# Patient Record
(demographics unavailable — no encounter records)

---

## 2025-01-21 NOTE — ASSESSMENT
[FreeTextEntry1] : 34yo M hypogonadism bilateral varicoclee TT therapeutic on clomid past failed IUI. Sperm frozen for IVF as backup We had an in depth conversation regarding the benefit of varicocelectomy today. He understands the literature which overwhelming reports a benefit in improvement in semen parameters. He also understands that there is limited data in terms of spontaneous pregnancy and take home baby rates.There is good data suggesting improved semen parameters which can possibly obviate the need for future IVF and has been shown to improve IVF outcomes in selected patients. We discussed the recent results of a [place] review which reported that one natural pregnancy is achieved for every 3-5 varicocele repairs over an unknown time frame. We also spoke about the fact that there is a 3 to 6 month delay before improvement is seen in semen parameters. The role of advancing maternal age was discussed in depth.  Surgical risks, including a risk of infection, injury to the testicular artery (extremely rare), injury to the vas deferens and hydrocele were discussed, as was post operative pain and pain control. Post operative skin numbness in the anterior thigh/lateral scrotum were also discussed. opts to tatiana  will schedule

## 2025-01-21 NOTE — HISTORY OF PRESENT ILLNESS
[FreeTextEntry1] : KALA PINZON is a 33 year M presenting on 01/21/2025 PMH: Hypogonadism, left varicocele  5 mo F/U  Hypogonadism: oligoasthenopsermia Remains on clomid 50 mg QOD Trying for 4 years IUI 4/2024, miscarriage after 2 months 3.3mm L varicocele, 2.9mm R  Has sperm frozen 8/2024:   SA, 11/2024 vol 2.8 mL, 42.1 M/mL, 36% motil, morph 3%  SA for 2nd IUI 09/26/2023 1.2 mL/16.4 mill/ml/21% motility  SA: 1.5ml/21 mil/ml/10% motility/6%  repeat SA with First IUI: 2.4 ml/12.3 mil/ml/23% motlilty

## 2025-01-21 NOTE — PHYSICAL EXAM
[Normal Appearance] : normal appearance [Well Groomed] : well groomed [General Appearance - In No Acute Distress] : no acute distress [Respiration, Rhythm And Depth] : normal respiratory rhythm and effort [Exaggerated Use Of Accessory Muscles For Inspiration] : no accessory muscle use [Normal Station and Gait] : the gait and station were normal for the patient's age [No Focal Deficits] : no focal deficits [] : no rash [Oriented To Time, Place, And Person] : oriented to person, place, and time [Affect] : the affect was normal [Mood] : the mood was normal

## 2025-03-11 NOTE — PHYSICAL EXAM
[General Appearance - Well Developed] : well developed [General Appearance - Well Nourished] : well nourished [Chaperone Present] : A chaperone was present in the examining room during all aspects of the physical examination [Urethral Meatus] : meatus normal [Penis Abnormality] : normal circumcised penis [de-identified] : left GII [FreeTextEntry2] : [place]

## 2025-03-11 NOTE — HISTORY OF PRESENT ILLNESS
[FreeTextEntry1] : Dear Dr. Hernandez (Urologist),   Thank you so much for the referral to help care for your patient.  Chief Complaint: 3.3mm left Varicocele and 2.9mm right varicocele  Date of first visit: 03/11/2025  KALA PINZON  is a 33-year-old male who presents for varicoceles.  the right side is < 3mm.  He was recommended surgery or embolization.  He was nervous about surgery and Dr. EPPS sent him for a referral.  The patient has one child but for the last 4 years they have been unsuccessful.   He is being treated for hypogonadism/ oligoasthenopsermia.  Remains on clomid 50 mg QOD but the oligospermia has resolved.   IUI 4/2024, miscarriage after 2 months 3.3mm L varicocele, 2.9mm R Has sperm frozen 8/2024:   SA, 11/2024 vol 2.8 mL, 42.1 M/mL, 36% motil, morph 3%  SA for 2nd IUI 09/26/2023 1.2 mL/16.4 mill/ml/21% motility  SA 07/28/2023  Volume: 1.5  pH: 8.0 Concentration: 21,000,000/mL  Total: 32,000,000 Motility was 10% giving a total motile sperm count of 2 million  Forward progression was 2 Agglutination: None Normal morphology was 6%  03/11/2025 IPSS  QOL  ELLY  EPIC 26   The patient denies fevers, chills, nausea and or vomiting and no unexplained weight loss.  All pertinent laboratory, films and physician notes were reviewed.  Questionnaire results were discussed with patient.

## 2025-03-11 NOTE — ASSESSMENT
[FreeTextEntry1] : 33-year-old male with Hypogonadism, left varicocele GII (palp) and the right side does not meet criteria at this time to treat.  We explained the goals of treatment as outlined below.  The issue is that on clomid the SA has improved to normal limits.  He does not have pain associated with the varicocele.  He would like to get off the meds and improve his feritliy chances   Sedation / malampatti I - takes no meds no issues with sedation previously  We discussed disease process and treatment options for symptomatic varicoceles and infertility. The patient understands the scrotal pain may resolve the heaviness that he feels in his scrotum however improving his fertility is not 100% guaranteed. There are studies that show there is improvement in semen parameters after treatment of the varicocele.  We discussed the risks and benefits alternatives associated with gonadal embolization regards to nontarget embolization complications of access and migration of coils. We also discussed the success rates of procedures in comparison to surgery.  We had an in-depth conversation regarding the benefit of varicocelectomy today. He understands the literature which overwhelming reports a benefit in improvement in semen parameters. He also understands that there is limited data in terms of spontaneous pregnancy and take-home baby rates. There is good data suggesting improved semen parameters which can possibly obviate the need for future IVF and has been shown to improve IVF outcomes in selected patients. We discussed the recent results of a [place] review which reported that one natural pregnancy is achieved for every 3-5 varicocele repairs over an unknown time frame. We also spoke about the fact that there is a 3-to-6-month delay before improvement is seen in semen parameters. The role of advancing maternal age was discussed in depth.  Surgical risks, including a risk of infection, injury to the testicular artery (extremely rare), injury to the vas deferens and hydrocele were discussed, as was post operative pain and pain control. Post operative skin numbness in the anterior thigh/lateral scrotum were also discussed.  1. Motrin 800 mg po BID start of the procedure 2. Labs C,7 3. Follow up 1 month after procedure 4. 4-6 months US and Semen analysis (69 days for new sperm to mature) -[facility] Dr Vuong.  Thank you very much for allowing me to assist in the care of this patient. Please do not hesitate to contact me with any additional questions or concerns.  Sincerely,   Bladimir Stewart D.O. Professor of Urology and Radiology  of Urology at [facility] Director for Prostate Cancer [address] [place] [address], 5th Floor [facility], [facility] Phone: [phone].

## 2025-03-11 NOTE — ASSESSMENT
[FreeTextEntry1] : 33-year-old male with Hypogonadism, left varicocele GII (palp) and the right side does not meet criteria at this time to treat.  We explained the goals of treatment as outlined below.  The issue is that on clomid the SA has improved to normal limits.  He does not have pain associated with the varicocele.  He would like to get off the meds and improve his feritliy chances   Sedation / malampatti I - takes no meds no issues with sedation previously  We discussed disease process and treatment options for symptomatic varicoceles and infertility. The patient understands the scrotal pain may resolve the heaviness that he feels in his scrotum however improving his fertility is not 100% guaranteed. There are studies that show there is improvement in semen parameters after treatment of the varicocele.  We discussed the risks and benefits alternatives associated with gonadal embolization regards to nontarget embolization complications of access and migration of coils. We also discussed the success rates of procedures in comparison to surgery.  We had an in-depth conversation regarding the benefit of varicocelectomy today. He understands the literature which overwhelming reports a benefit in improvement in semen parameters. He also understands that there is limited data in terms of spontaneous pregnancy and take-home baby rates. There is good data suggesting improved semen parameters which can possibly obviate the need for future IVF and has been shown to improve IVF outcomes in selected patients. We discussed the recent results of a [place] review which reported that one natural pregnancy is achieved for every 3-5 varicocele repairs over an unknown time frame. We also spoke about the fact that there is a 3-to-6-month delay before improvement is seen in semen parameters. The role of advancing maternal age was discussed in depth.  Surgical risks, including a risk of infection, injury to the testicular artery (extremely rare), injury to the vas deferens and hydrocele were discussed, as was post operative pain and pain control. Post operative skin numbness in the anterior thigh/lateral scrotum were also discussed.  1. Motrin 800 mg po BID start of the procedure 2. Labs C,7 3. Follow up 1 month after procedure 4. 4-6 months US and Semen analysis (69 days for new sperm to mature) -[facility] Dr Vuong.  Thank you very much for allowing me to assist in the care of this patient. Please do not hesitate to contact me with any additional questions or concerns.  Sincerely,   Bladimir Stewart D.O. Professor of Urology and Radiology  of Urology at [facility] Director for Prostate Cancer [address] [place] [address], 5th Floor [facility], [facility] Phone: [phone].

## 2025-03-11 NOTE — PHYSICAL EXAM
[General Appearance - Well Developed] : well developed [General Appearance - Well Nourished] : well nourished [Chaperone Present] : A chaperone was present in the examining room during all aspects of the physical examination [Urethral Meatus] : meatus normal [Penis Abnormality] : normal circumcised penis [de-identified] : left GII [FreeTextEntry2] : [place]

## 2025-03-11 NOTE — PHYSICAL EXAM
[General Appearance - Well Developed] : well developed [General Appearance - Well Nourished] : well nourished [Chaperone Present] : A chaperone was present in the examining room during all aspects of the physical examination [Urethral Meatus] : meatus normal [Penis Abnormality] : normal circumcised penis [de-identified] : left GII [FreeTextEntry2] : [place]

## 2025-03-11 NOTE — ASSESSMENT
[FreeTextEntry1] : 33-year-old male with Hypogonadism, left varicocele GII (palp) and the right side does not meet criteria at this time to treat.  We explained the goals of treatment as outlined below.  The issue is that on clomid the SA has improved to normal limits.  He does not have pain associated with the varicocele.  He would like to get off the meds and improve his feritliy chances   Sedation / malampatti I - takes no meds no issues with sedation previously  We discussed disease process and treatment options for symptomatic varicoceles and infertility. The patient understands the scrotal pain may resolve the heaviness that he feels in his scrotum however improving his fertility is not 100% guaranteed. There are studies that show there is improvement in semen parameters after treatment of the varicocele.  We discussed the risks and benefits alternatives associated with gonadal embolization regards to nontarget embolization complications of access and migration of coils. We also discussed the success rates of procedures in comparison to surgery.  We had an in-depth conversation regarding the benefit of varicocelectomy today. He understands the literature which overwhelming reports a benefit in improvement in semen parameters. He also understands that there is limited data in terms of spontaneous pregnancy and take-home baby rates. There is good data suggesting improved semen parameters which can possibly obviate the need for future IVF and has been shown to improve IVF outcomes in selected patients. We discussed the recent results of a [place] review which reported that one natural pregnancy is achieved for every 3-5 varicocele repairs over an unknown time frame. We also spoke about the fact that there is a 3-to-6-month delay before improvement is seen in semen parameters. The role of advancing maternal age was discussed in depth.  Surgical risks, including a risk of infection, injury to the testicular artery (extremely rare), injury to the vas deferens and hydrocele were discussed, as was post operative pain and pain control. Post operative skin numbness in the anterior thigh/lateral scrotum were also discussed.  1. Motrin 800 mg po BID start of the procedure 2. Labs C,7 3. Follow up 1 month after procedure 4. 4-6 months US and Semen analysis (69 days for new sperm to mature) -[facility] Dr Vuong.  Thank you very much for allowing me to assist in the care of this patient. Please do not hesitate to contact me with any additional questions or concerns.  Sincerely,   Bladimir Stewatr D.O. Professor of Urology and Radiology  of Urology at [facility] Director for Prostate Cancer [address] [place] [address], 5[address] Floor [facility], [facility] Phone: [phone].

## 2025-05-05 NOTE — ASSESSMENT
[FreeTextEntry1] : 33-year-old male with Hypogonadism, left varicocele GII (palp) and the right side does not meet criteria at this time to treat. We explained the goals of treatment as outlined below. The issue is that on clomid the SA has improved to normal limits. He does not have pain associated with the varicocele. He would like to get off the meds and improve his feritliy chances  Sedation / malampatti I - takes no meds no issues with sedation previously  We discussed disease process and treatment options for symptomatic varicoceles and infertility. The patient understands the scrotal pain may resolve the heaviness that he feels in his scrotum however improving his fertility is not 100% guaranteed. There are studies that show there is improvement in semen parameters after treatment of the varicocele.  We discussed the risks and benefits alternatives associated with gonadal embolization regards to nontarget embolization complications of access and migration of coils. We also discussed the success rates of procedures in comparison to surgery.  We had an in-depth conversation regarding the benefit of varicocelectomy today. He understands the literature which overwhelming reports a benefit in improvement in semen parameters. He also understands that there is limited data in terms of spontaneous pregnancy and take-home baby rates. There is good data suggesting improved semen parameters which can possibly obviate the need for future IVF and has been shown to improve IVF outcomes in selected patients. We discussed the recent results of a [place] review which reported that one natural pregnancy is achieved for every 3-5 varicocele repairs over an unknown time frame. We also spoke about the fact that there is a 3-to-6-month delay before improvement is seen in semen parameters. The role of advancing maternal age was discussed in depth.  Surgical risks, including a risk of infection, injury to the testicular artery (extremely rare), injury to the vas deferens and hydrocele were discussed, as was post operative pain and pain control. Post operative skin numbness in the anterior thigh/lateral scrotum were also discussed.  1. Motrin 800 mg po BID start of the procedure 2. Labs C,7 3. Follow up 1 month after procedure 4. 4-6 months US and Semen analysis (69 days for new sperm to mature) -[facility] Dr Vuong.  Thank you very much for allowing me to assist in the care of this patient. Please do not hesitate to contact me with any additional questions or concerns.  Sincerely,

## 2025-05-05 NOTE — HISTORY OF PRESENT ILLNESS
[FreeTextEntry1] : Dear Dr. Hernandez (Urologist),  Thank you so much for the referral to help care for your patient.  Chief Complaint: 3.3mm left Varicocele and 2.9mm right varicocele Date of first visit: 03/11/2025  KALA PINZON is a 33-year-old male who presents for varicoceles. the right side is < 3mm. He was recommended surgery or embolization. He was nervous about surgery and Dr. EPPS sent him for a referral. The patient has one child but for the last 4 years they have been unsuccessful. He is being treated for hypogonadism/ oligoasthenopsermia. Remains on clomid 50 mg QOD but the oligospermia has resolved.   IUI 4/2024, miscarriage after 2 months 3.3mm L varicocele, 2.9mm R Has sperm frozen 8/2024:   SA, 11/2024 vol 2.8 mL, 42.1 M/mL, 36% motil, morph 3%  SA for 2nd IUI 09/26/2023 1.2 mL/16.4 mill/ml/21% motility  SA 07/28/2023 Volume: 1.5 pH: 8.0 Concentration: 21,000,000/mL Total: 32,000,000 Motility was 10% giving a total motile sperm count of 2 million Forward progression was 2 Agglutination: None Normal morphology was 6%  05/13/2025 IPSS  QOL  ELLY   03/11/2025 IPSS QOL ELLY EPIC 26  The patient denies fevers, chills, nausea and or vomiting and no unexplained weight loss.  All pertinent laboratory, films and physician notes were reviewed. Questionnaire results were discussed with patient.

## 2025-05-06 NOTE — ASSESSMENT
[FreeTextEntry1] : 33-year-old male with Hypogonadism, left varicocele GII (palp) and the right side does not meet criteria at this time to treat. We explained the goals of treatment as outlined below. The issue is that on clomid the SA has improved to normal limits. He does not have pain associated with the varicocele. He would like to get off the meds and improve his feritliy chances  Sedation / malampatti I - takes no meds no issues with sedation previously  We discussed disease process and treatment options for symptomatic varicoceles and infertility. The patient understands the scrotal pain may resolve the heaviness that he feels in his scrotum however improving his fertility is not 100% guaranteed. There are studies that show there is improvement in semen parameters after treatment of the varicocele.  We discussed the risks and benefits alternatives associated with gonadal embolization regards to nontarget embolization complications of access and migration of coils. We also discussed the success rates of procedures in comparison to surgery.  We had an in-depth conversation regarding the benefit of varicocelectomy today. He understands the literature which overwhelming reports a benefit in improvement in semen parameters. He also understands that there is limited data in terms of spontaneous pregnancy and take-home baby rates. There is good data suggesting improved semen parameters which can possibly obviate the need for future IVF and has been shown to improve IVF outcomes in selected patients. We discussed the recent results of a [place] review which reported that one natural pregnancy is achieved for every 3-5 varicocele repairs over an unknown time frame. We also spoke about the fact that there is a 3-to-6-month delay before improvement is seen in semen parameters. The role of advancing maternal age was discussed in depth.  Surgical risks, including a risk of infection, injury to the testicular artery (extremely rare), injury to the vas deferens and hydrocele were discussed, as was post operative pain and pain control. Post operative skin numbness in the anterior thigh/lateral scrotum were also discussed.  1. Motrin 800 mg po BID start of the procedure 2. Labs C,7 3. Follow up 1 month after procedure 4. 4-6 months US and Semen analysis (69 days for new sperm to mature) -[facility] Dr Vuong.  Thank you very much for allowing me to assist in the care of this patient. Please do not hesitate to contact me with any additional questions or concerns.  Sincerely,